# Patient Record
Sex: FEMALE | Race: WHITE | NOT HISPANIC OR LATINO | Employment: PART TIME | URBAN - METROPOLITAN AREA
[De-identification: names, ages, dates, MRNs, and addresses within clinical notes are randomized per-mention and may not be internally consistent; named-entity substitution may affect disease eponyms.]

---
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
## 2019-04-04 ENCOUNTER — OFFICE VISIT (OUTPATIENT)
Dept: URGENT CARE | Facility: PHYSICIAN GROUP | Age: 19
End: 2019-04-04
Payer: COMMERCIAL

## 2019-04-04 ENCOUNTER — HOSPITAL ENCOUNTER (OUTPATIENT)
Facility: MEDICAL CENTER | Age: 19
End: 2019-04-04
Attending: EMERGENCY MEDICINE
Payer: COMMERCIAL

## 2019-04-04 VITALS
RESPIRATION RATE: 12 BRPM | HEART RATE: 118 BPM | HEIGHT: 68 IN | OXYGEN SATURATION: 97 % | BODY MASS INDEX: 27.89 KG/M2 | DIASTOLIC BLOOD PRESSURE: 78 MMHG | WEIGHT: 184 LBS | SYSTOLIC BLOOD PRESSURE: 112 MMHG | TEMPERATURE: 97 F

## 2019-04-04 DIAGNOSIS — R30.0 DYSURIA: ICD-10-CM

## 2019-04-04 LAB
APPEARANCE UR: NORMAL
BILIRUB UR STRIP-MCNC: NORMAL MG/DL
COLOR UR AUTO: YELLOW
GLUCOSE UR STRIP.AUTO-MCNC: NEGATIVE MG/DL
INT CON NEG: NEGATIVE
INT CON POS: POSITIVE
KETONES UR STRIP.AUTO-MCNC: 40 MG/DL
LEUKOCYTE ESTERASE UR QL STRIP.AUTO: NORMAL
NITRITE UR QL STRIP.AUTO: NEGATIVE
PH UR STRIP.AUTO: 5 [PH] (ref 5–8)
POC URINE PREGNANCY TEST: NEGATIVE
PROT UR QL STRIP: NEGATIVE MG/DL
RBC UR QL AUTO: NORMAL
SP GR UR STRIP.AUTO: 1.03
UROBILINOGEN UR STRIP-MCNC: 0.2 MG/DL

## 2019-04-04 PROCEDURE — 87077 CULTURE AEROBIC IDENTIFY: CPT

## 2019-04-04 PROCEDURE — 87086 URINE CULTURE/COLONY COUNT: CPT

## 2019-04-04 PROCEDURE — 81002 URINALYSIS NONAUTO W/O SCOPE: CPT | Performed by: EMERGENCY MEDICINE

## 2019-04-04 PROCEDURE — 81025 URINE PREGNANCY TEST: CPT | Performed by: EMERGENCY MEDICINE

## 2019-04-04 PROCEDURE — 99203 OFFICE O/P NEW LOW 30 MIN: CPT | Performed by: EMERGENCY MEDICINE

## 2019-04-04 PROCEDURE — 87491 CHLMYD TRACH DNA AMP PROBE: CPT

## 2019-04-04 PROCEDURE — 87591 N.GONORRHOEAE DNA AMP PROB: CPT

## 2019-04-04 PROCEDURE — 87186 SC STD MICRODIL/AGAR DIL: CPT

## 2019-04-04 RX ORDER — SULFAMETHOXAZOLE AND TRIMETHOPRIM 800; 160 MG/1; MG/1
1 TABLET ORAL 2 TIMES DAILY
Qty: 6 TAB | Refills: 0 | Status: SHIPPED | OUTPATIENT
Start: 2019-04-04 | End: 2019-04-07

## 2019-04-04 RX ORDER — DULOXETIN HYDROCHLORIDE 20 MG/1
20 CAPSULE, DELAYED RELEASE ORAL DAILY
COMMUNITY

## 2019-04-04 ASSESSMENT — ENCOUNTER SYMPTOMS
CHILLS: 0
VOMITING: 0
ROS GI COMMENTS: NO DECREASED APPETITE.
NAUSEA: 1
FLANK PAIN: 0

## 2019-04-05 DIAGNOSIS — R30.0 DYSURIA: ICD-10-CM

## 2019-04-05 LAB
C TRACH DNA SPEC QL NAA+PROBE: NEGATIVE
N GONORRHOEA DNA SPEC QL NAA+PROBE: NEGATIVE
SPECIMEN SOURCE: NORMAL

## 2019-04-05 NOTE — PROGRESS NOTES
Subjective:      Marcela Duarte is a 18 y.o. female who presents with Dysuria (frequent and painful urinating, lower abdominal pain x 3 days )            Dysuria    This is a recurrent problem. Episode onset: 2 days. The problem occurs every urination. The problem has been waxing and waning. The quality of the pain is described as burning. The pain is mild. There has been no fever. She is sexually active. There is a history of pyelonephritis. Associated symptoms include frequency and nausea. Pertinent negatives include no chills, discharge, flank pain, hematuria, urgency or vomiting. She has tried increased fluids for the symptoms. The treatment provided mild relief. Her past medical history is significant for recurrent UTIs. There is no history of kidney stones.   History of VUR.  Notes lower abdominal pressure typical for UTI in the past.    Review of Systems   Constitutional: Negative for chills.   Gastrointestinal: Positive for nausea. Negative for vomiting.        No decreased appetite.   Genitourinary: Positive for dysuria and frequency. Negative for flank pain, hematuria and urgency.        LMP last week. No vaginal discharge or bleeding.  Notes unprotected coital sexual intercourse.   Skin: Negative for rash.     PMH:  has no past medical history on file.  MEDS:   Current Outpatient Prescriptions:   •  DULoxetine (CYMBALTA) 20 MG Cap DR Particles, Take 20 mg by mouth every day., Disp: , Rfl:   •  sulfamethoxazole-trimethoprim (BACTRIM DS) 800-160 MG tablet, Take 1 Tab by mouth 2 times a day for 3 days., Disp: 6 Tab, Rfl: 0  •  Norgestim-Eth Estrad Triphasic 0.18/0.215/0.25 MG-35 MCG Tab, Take 1 Tab by mouth every day., Disp: , Rfl:   •  clindamycin (CLEOCIN) 300 MG Cap, Take 1 Cap by mouth 3 times a day. (Patient not taking: Reported on 4/4/2019), Disp: 21 Quantity Sufficient, Rfl: 0  ALLERGIES:   Allergies   Allergen Reactions   • Suprax      SURGHX: History reviewed. No pertinent surgical  "history.  SOCHX:  reports that she has never smoked. She has never used smokeless tobacco. She reports that she does not drink alcohol.  FH: family history is not on file.       Objective:     /78   Pulse (!) 118   Temp 36.1 °C (97 °F) (Temporal)   Resp 12   Ht 1.727 m (5' 8\")   Wt 83.5 kg (184 lb)   LMP 03/28/2019   SpO2 97%   BMI 27.98 kg/m²      Physical Exam   Constitutional: She is oriented to person, place, and time. She appears well-developed and well-nourished. She is cooperative. She does not have a sickly appearance. She does not appear ill.   Cardiovascular: Normal rate, regular rhythm and normal heart sounds.    No murmur heard.  Not tachycardic   Pulmonary/Chest: Effort normal and breath sounds normal.   Abdominal: Soft. Bowel sounds are normal. There is tenderness in the suprapubic area. There is no rigidity, no rebound, no guarding, no CVA tenderness and no tenderness at McBurney's point.   Neurological: She is alert and oriented to person, place, and time. Gait normal.   Skin: Skin is warm and dry.   Psychiatric: Her mood appears anxious. Her affect is not inappropriate.               Assessment/Plan:     1. Dysuria  Positive ketone, protein, blood, LE- POCT Urinalysis  negative- POCT Pregnancy  - URINE CULTURE(NEW); Future  - CHLAMYDIA/GC PCR URINE OR SWAB; Future  - sulfamethoxazole-trimethoprim (BACTRIM DS) 800-160 MG tablet; Take 1 Tab by mouth 2 times a day for 3 days.  Dispense: 6 Tab; Refill: 0  Recommended supportive care measures, including rest, increasing oral fluid intake and use of over-the-counter medications for relief of symptoms.    "

## 2019-04-05 NOTE — PATIENT INSTRUCTIONS
You may also use over-the-counter phenazopyridine (AZO) as needed for urinary burning symptom relief.  Use an oral probiotic daily, such as Culturelle, Align, or yogurt to reduce gastrointestinal symptoms from antibiotic use.  Go to the nearest hospital Emergency Department if any worsening condition.  Urinary Tract Infection, Adult  Introduction  A urinary tract infection (UTI) is an infection of any part of the urinary tract. The urinary tract includes the:  · Kidneys.  · Ureters.  · Bladder.  · Urethra.  These organs make, store, and get rid of pee (urine) in the body.  Follow these instructions at home:  · Take over-the-counter and prescription medicines only as told by your doctor.  · If you were prescribed an antibiotic medicine, take it as told by your doctor. Do not stop taking the antibiotic even if you start to feel better.  · Avoid the following drinks:  ¨ Alcohol.  ¨ Caffeine.  ¨ Tea.  ¨ Carbonated drinks.  · Drink enough fluid to keep your pee clear or pale yellow.  · Keep all follow-up visits as told by your doctor. This is important.  · Make sure to:  ¨ Empty your bladder often and completely. Do not to hold pee for long periods of time.  ¨ Empty your bladder before and after sex.  ¨ Wipe from front to back after a bowel movement if you are female. Use each tissue one time when you wipe.  Contact a doctor if:  · You have back pain.  · You have a fever.  · You feel sick to your stomach (nauseous).  · You throw up (vomit).  · Your symptoms do not get better after 3 days.  · Your symptoms go away and then come back.  Get help right away if:  · You have very bad back pain.  · You have very bad lower belly (abdominal) pain.  · You are throwing up and cannot keep down any medicines or water.  This information is not intended to replace advice given to you by your health care provider. Make sure you discuss any questions you have with your health care provider.  Document Released: 06/05/2009 Document  Revised: 05/25/2017 Document Reviewed: 11/07/2016  © 2017 [employer]

## 2019-04-07 ENCOUNTER — TELEPHONE (OUTPATIENT)
Dept: URGENT CARE | Facility: CLINIC | Age: 19
End: 2019-04-07

## 2019-04-07 DIAGNOSIS — N30.00 ACUTE CYSTITIS WITHOUT HEMATURIA: ICD-10-CM

## 2019-04-07 LAB
BACTERIA UR CULT: ABNORMAL
BACTERIA UR CULT: ABNORMAL
SIGNIFICANT IND 70042: ABNORMAL
SITE SITE: ABNORMAL
SOURCE SOURCE: ABNORMAL

## 2019-04-07 RX ORDER — NITROFURANTOIN 25; 75 MG/1; MG/1
100 CAPSULE ORAL 2 TIMES DAILY
Qty: 10 CAP | Refills: 0 | Status: SHIPPED | OUTPATIENT
Start: 2019-04-07 | End: 2019-04-12

## 2019-04-07 NOTE — TELEPHONE ENCOUNTER
Please notify patient of urine culture positive for infection, but bacteria resistant to current antibiotic.  Need to change to new Rx sent to pharmacy.